# Patient Record
Sex: FEMALE | Race: WHITE
[De-identification: names, ages, dates, MRNs, and addresses within clinical notes are randomized per-mention and may not be internally consistent; named-entity substitution may affect disease eponyms.]

---

## 2018-01-30 ENCOUNTER — HOSPITAL ENCOUNTER (EMERGENCY)
Dept: HOSPITAL 92 - ERS | Age: 23
Discharge: HOME | End: 2018-01-30
Payer: COMMERCIAL

## 2018-01-30 DIAGNOSIS — J06.9: Primary | ICD-10-CM

## 2018-01-30 PROCEDURE — 71045 X-RAY EXAM CHEST 1 VIEW: CPT

## 2018-01-30 NOTE — RAD
PORTABLE CHEST:

1/30/18

 

HISTORY: 

Cough and congestion. Pleuritic chest pain.

 

Heart size and mediastinum are within normal limits. The lungs are clear of infiltrates. There are no
 significant bony findings. 

 

IMPRESSION:  

No active intrathoracic disease. 

 

POS: [facility]